# Patient Record
Sex: MALE | Race: WHITE
[De-identification: names, ages, dates, MRNs, and addresses within clinical notes are randomized per-mention and may not be internally consistent; named-entity substitution may affect disease eponyms.]

---

## 2020-06-13 ENCOUNTER — HOSPITAL ENCOUNTER (EMERGENCY)
Dept: HOSPITAL 96 - M.ERS | Age: 20
Discharge: HOME | End: 2020-06-13
Payer: COMMERCIAL

## 2020-06-13 VITALS — BODY MASS INDEX: 24.38 KG/M2 | WEIGHT: 189.99 LBS | HEIGHT: 74 IN

## 2020-06-13 VITALS — SYSTOLIC BLOOD PRESSURE: 143 MMHG | DIASTOLIC BLOOD PRESSURE: 91 MMHG

## 2020-06-13 DIAGNOSIS — R07.0: Primary | ICD-10-CM

## 2021-07-26 NOTE — NUR
0905:PT. ARRIVED FROM ER TO PREOP HOLDING AREA AS A BOARDING PATIENT. NO
NAUSEA. PAIN CURRENTLY "2-3"...NOW DULL. IT WAS TIGHT. STATES PAIN IS
TOLERABLE AT THIS TIME. GIRLFRIEND, BERNADETTE LIU AT BEDSIDE AND IS VERY
SUPPORTIVE.
1725:PT. HAS BEEN RESTING WELL ALL DAY--VOIDED 3 TIMES SO FAR TODAY-- LORNA
COLORED URINE. HAS NOT REQUIRED ANY FURTHER PAIN OR NAUSEA MEDS SINCE ARRIVAL
THIS AM. PATIENT INSTRUCTED OF ORDER FOR SURGERY TOMORROW- DARIUS IVAN AT 0830 AM 7/27/21--PT AND GF VERBALIZE UNDERSTANDING. WILL REMAIN
NPO.

## 2021-07-26 NOTE — NUR
20 YEAR OLD MALE PATIENT ADMITTED TO ROOM 224 AT 1830 WITH CHOLECYSTITIS AND
PANCREATITIS. PT REMAINS NPO, CHOLEYCYSECTOMY SCHEDULED FOR TOMMORROW MORNING.
PT DENIES PAIN OR NAUSEA, WILL NOTIFY NURSE IF THAT CHANGES. PT ORIENTED TO
ROOM, CALL LIGHT AND BED CONTROLS.

## 2021-07-26 NOTE — NUR
Admission Assessment
Admitted from   Home
 
Mental Status upon admission A&O X3
 
Living Arrangements:    CONDO
 
Lives with: or they live with patient  LIZETT LIU GIRLFRIEND
 
Support system: Name  Phone number  Relationship
 LIZETT LIU  [phone] GIRLFRIEND
 
 ELVA OGLESBY [phone] MOTHER
Can patient return to prior living arrangements? YES
 
Activities of daily living:  Independent
 
Assistive device: NONE
 
 
Prior resource use: None
 
 
 
Does patient have a primary care provider? NO

## 2021-07-27 NOTE — NUR
PATIENT RETURNED FROM SURGERY DROWSY BUT ABLE TO ANSWER QUESTIONS WHEN ASKED.
4 LAP SITES WITH STERI STRIPS NOTED ON ABDOMEN APOXIMATED WELL. NO C/O N/V.
TAKING ICE CHIPS AND WATER WITHOUT DIFFICULTY.  CALL LIGHT WITHIN REACH.

## 2021-07-27 NOTE — NUR
PATIENT HAS RESTED WELL THROUGHOUT MOST OF THE NIGHT. VSS ON RA. NO NAUSEA OR
VOMITING DURING THE NIGHT. PATIENT HAS REMAINED NPO SINCE MIDNIGHT D/T
SCHEDULED SURGERY THIS AM. IV IN RIGHT AC-SL. IV IN LEFT FOREARM-LR @
250ML/HR. PATIENT INSTRUCTED TO USE CALL LIGHT WHEN NEEDING ASSISTANCE. HOURLY
ROUNDS MADE. WILL CONTINUE WITH PLAN OF CARE AND NURSING TO MONITOR.

## 2021-07-27 NOTE — NUR
PATIENT VERALIZED UNDERSTANDING OF DISCHARGE INSTRUCTIONS.  USING PO PAIN
MEDICATION AT DISCHARGE TO HELP WITH ABDOMINAL PAIN.  LAP SITES X4 WITH STERI
STRIPS INTACT.  SCANT AMOUNT OF RED DRAINAGE FROM MIDDLE LAP SITE.  VOIDING
WITHOUT DIFFICULTY.  NO C/O N/V EATTING REGULAR DIET.  LEFT VIA W/C WITH
FAMILY.

## 2021-07-27 NOTE — NUR
PLAN OF CARE:
PLAN FOR PT TO POSSIBLY D/C HOME TODAY. PENDING SX RECOMMENDAITONS. NO CM D/C
PLANNING NEEDS ANTICIPATED. CM WILL REMAIN AVAILABLE TO ASSIST AND FOLLOW AS
NEEDED.

## 2021-07-29 NOTE — PATH
[address]  [address]                    PATHOLOGY RPT PROCEDURE       
_______________________________________________________________________________
 
Name:       CHAU CASTILLO           Room:           [address] [place]  
M.R.#:  [id number]      Account #:      [id number]  
Admission:  07/26/21     Date of Birth:  [birth year]  
Discharge:  07/27/21                   Report #:    [id number]
                                                         Path Case #: [id number]
_______________________________________________________________________________
 
[facility] Accession Number: [id number]
.                                                                01
Material submitted:                                        .
gallbladder - GALLBLADDER
.                                                                01
Clinical history:                                          .
CHOLELITHIASIS
.                                                                02
**********************************************************************
Diagnosis:
Gallbladder:
- Chronic cholecystitis.
(KELI/db; 7/28/2021)
LBQ  07/28/2021  1623 Local
**********************************************************************
.                                                                02
Electronically signed:                                     .
Eloy Carrillo MD, Pathologist
NPI- [id number]
.                                                                01
Gross description:                                         .
Fixative: Formalin
Labeled: Gallbladder
Specimen received: An intact gallbladder
Dimensions: 7.5 x 3.5 x 2.5 cm
Serosa: Tan, focally hemorrhagic and smooth
Lymph node: Not present
Mucosa: Brown, bile-stained and velvety
Average wall thickness: 0.5
Calculi: None present
Abnormalities: None identified
A1- Representative body, fundus, and the cystic duct margin.
(MRF; 7/27/2021)
MFE/MFE  07/27/2021  1853 Local
.                                                                02
Pathologist provided ICD-10:
K81.1
.                                                                02
CPT                                                        .
934905
Specimen Comment: A courtesy copy of this report has been sent to [phone]
Specimen Comment: Report sent to 
***Performed at:  01
   [address], [place], [place]  [id number]
   MD Roque Bui MD Phone:  [phone]
***Performed at:  02
   [address]  [address]                    PATHOLOGY RPT PROCEDURE       
_______________________________________________________________________________
 
Name:       CHAU CASTILLO           Room:           [address] [place]  
[facility]#:  [id number]      Account #:      [id number]  
Admission:  07/26/21     Date of Birth:  [birth year]  
Discharge:  07/27/21                   Report #:    [id number]
                                                         Path Case #: [id number]
_______________________________________________________________________________
   [address] [place], [place]  [id number]
   MD Eloy Carrillo MD Phone:  [phone]

## 2021-08-20 NOTE — OP
[facility] 
[address] [place]  [address]                    OPERATIVE REPORT              
_______________________________________________________________________________
 
Name:       CAHU CASTILLO           Room:           [address] [place]  
M.R.#:  [id number]      Account #:      [id number]  
Admission:  07/26/21     Attend Phys:    Edward Chang
Discharge:  07/27/21     Date of Birth:  [birth year]  
         Report #: [id number]
                                                                     [id number]
_______________________________________________________________________________
THIS REPORT FOR:  
 
cc:  DONIS - No family physician/PCP 
     FAM - No family physician/PCP                                        
     Edward Chang MD                                          ~
 
DATE OF SURGERY: 07/27/2021
 
PREOPERATIVE DIAGNOSIS:  Gallstone pancreatitis.
 
POSTOPERATIVE DIAGNOSIS:  Gallstone pancreatitis.
 
OPERATION:  Laparoscopic cholecystectomy with intraoperative cholangiogram.
 
SURGEON:  Edward Chang MD
 
ANESTHESIA:  General.
 
ESTIMATED BLOOD LOSS:  Minimal.
 
SPECIMENS:  Gallbladder.
 
DESCRIPTION OF PROCEDURE:  After informed consent was obtained, the patient was 
brought to the operating room and placed supine.  SCDs were placed and working, 
preoperative antibiotics were administered, general anesthesia was induced.  The
abdomen was prepped and draped in the usual sterile fashion.  A 10 mm incision 
was made below the umbilicus.  Fascia was incised and a trocar was placed.  
Pneumoperitoneum was established.  Three right upper quadrant 5 mm ports were 
placed.  Gallbladder was grasped at the fundus and retracted cephalad.  
Infundibulum was grasped and retracted laterally.  I dissected out the cystic 
duct and cystic artery.  Cystic duct was clipped.  A catheter was inserted after
a ductotomy was made.  Cholangiogram was performed.  This demonstrated filling 
of the cystic duct, common bile duct, common hepatic duct, bifurcation of the 
hepatics, smooth easy flow into the duodenum.  The common bile duct was somewhat
enlarged, but there were no filling defects.
 
The catheter was removed.  The cystic duct was clipped and ligated leaving a PDS
Endoloop and a clip on the remaining duct and a clip on the remaining artery.  
Gallbladder was then taken off the liver bed with electrocautery.  It was placed
into an Endopouch and removed.  The fascia was then closed with a 
figure-of-eight 0 Vicryl.  Skin was closed with 4-0 Monocryl.  Incisions were 
sealed with Steri-Strips.
 
COMPLICATIONS:  None.
 
 
 
[place] [address]                    OPERATIVE REPORT              
_______________________________________________________________________________
 
Name:       CHAU CASTILLO           Room:           [address]#:  [id number]      Account #:      [id number]  
Admission:  07/26/21     Attend Phys:    Edward Chang
Discharge:  07/27/21     Date of Birth:  [birth year]  
         Report #: [id number]
                                                                     [id number]
_______________________________________________________________________________
 
 
DISPOSITION:  The patient was taken to recovery in satisfactory condition.
 
 
 
 
 
 
 
 
 
 
 
 
 
 
 
 
 
 
 
 
 
 
 
 
 
 
 
 
 
 
 
 
 
 
 
 
 
 
 
 
 
<ELECTRONICALLY SIGNED>
                                        By:  Edward Chang MD     
08/20/21     0945
D: 07/27/21 1000_______________________________________
T: 07/27/21 1023Edward Chang MD        /nt